# Patient Record
Sex: FEMALE | Race: WHITE | Employment: OTHER | URBAN - METROPOLITAN AREA
[De-identification: names, ages, dates, MRNs, and addresses within clinical notes are randomized per-mention and may not be internally consistent; named-entity substitution may affect disease eponyms.]

---

## 2017-01-02 ENCOUNTER — TELEPHONE (OUTPATIENT)
Dept: NURSING | Facility: CLINIC | Age: 67
End: 2017-01-02

## 2017-01-02 DIAGNOSIS — R73.01 ELEVATED FASTING GLUCOSE: ICD-10-CM

## 2017-01-02 DIAGNOSIS — E78.5 ELEVATED LIPIDS: Primary | ICD-10-CM

## 2017-01-02 DIAGNOSIS — E87.5 HIGH SERUM POTASSIUM LEVEL: ICD-10-CM

## 2017-01-02 NOTE — TELEPHONE ENCOUNTER
"Result note 12/20/16: \"Cholesterol is high as are triglycerides. HDL-the good cholesterol is low. I would recommend ensuring focusing on both aerobic and strength training, maybe start by increasing an extra day each week. Weight loss would be helpful as well. Additionally, decreasing processed foods and sugars in the diet can help. If not already doing so, adding in fish oil and starting fiber supplement like metamucil may also be able to help. I would do this for 3-6 months and then return for repeat fasting cholesterol. If still elevated, would likely need medications.  Glucose was a bit high. I would recommend she return for hemoglobin A1c-please order this as future lab. Potassium was a little high as well, this is likely due to how the blood was handled, but we can recheck just to be sure-please order potassium as future lab.  Pap and HPV were negative, other labs normal.  Please call pt to notify.   Arlen Montemayor, DO.\"    Pt calling for results from 12/20/16. Read pt's result note. Future lab orders placed. Pt stated understanding and requested note from Dr. Montemayor be sent to her home address. Lab printed that stated note from Dr. Montemayor.   "

## 2018-06-04 ENCOUNTER — OFFICE VISIT (OUTPATIENT)
Dept: OBGYN | Facility: CLINIC | Age: 68
End: 2018-06-04
Payer: COMMERCIAL

## 2018-06-04 VITALS
HEIGHT: 64 IN | BODY MASS INDEX: 28.58 KG/M2 | WEIGHT: 167.4 LBS | DIASTOLIC BLOOD PRESSURE: 72 MMHG | SYSTOLIC BLOOD PRESSURE: 118 MMHG

## 2018-06-04 DIAGNOSIS — R30.0 DYSURIA: ICD-10-CM

## 2018-06-04 DIAGNOSIS — R39.89 POSSIBLE URINARY TRACT INFECTION: Primary | ICD-10-CM

## 2018-06-04 DIAGNOSIS — N89.8 ITCHING OF VAGINA: ICD-10-CM

## 2018-06-04 LAB
ALBUMIN UR-MCNC: NEGATIVE MG/DL
APPEARANCE UR: CLEAR
BACTERIA #/AREA URNS HPF: ABNORMAL /HPF
BILIRUB UR QL STRIP: NEGATIVE
COLOR UR AUTO: YELLOW
GLUCOSE UR STRIP-MCNC: NEGATIVE MG/DL
GRAM STN SPEC: NORMAL
GRAM STN SPEC: NORMAL
HGB UR QL STRIP: ABNORMAL
KETONES UR STRIP-MCNC: ABNORMAL MG/DL
LEUKOCYTE ESTERASE UR QL STRIP: NEGATIVE
MUCOUS THREADS #/AREA URNS LPF: PRESENT /LPF
NITRATE UR QL: NEGATIVE
PH UR STRIP: 5.5 PH (ref 5–7)
RBC #/AREA URNS AUTO: ABNORMAL /HPF
SOURCE: ABNORMAL
SP GR UR STRIP: 1.02 (ref 1–1.03)
SPECIMEN SOURCE: NORMAL
UROBILINOGEN UR STRIP-ACNC: 0.2 EU/DL (ref 0.2–1)
WBC #/AREA URNS AUTO: ABNORMAL /HPF

## 2018-06-04 PROCEDURE — 99213 OFFICE O/P EST LOW 20 MIN: CPT | Performed by: NURSE PRACTITIONER

## 2018-06-04 PROCEDURE — 87205 SMEAR GRAM STAIN: CPT | Performed by: NURSE PRACTITIONER

## 2018-06-04 PROCEDURE — 87653 STREP B DNA AMP PROBE: CPT | Performed by: NURSE PRACTITIONER

## 2018-06-04 PROCEDURE — 87086 URINE CULTURE/COLONY COUNT: CPT | Performed by: NURSE PRACTITIONER

## 2018-06-04 PROCEDURE — 87102 FUNGUS ISOLATION CULTURE: CPT | Performed by: NURSE PRACTITIONER

## 2018-06-04 PROCEDURE — 81001 URINALYSIS AUTO W/SCOPE: CPT | Performed by: NURSE PRACTITIONER

## 2018-06-04 RX ORDER — FLUCONAZOLE 150 MG/1
150 TABLET ORAL
Qty: 2 TABLET | Refills: 0 | Status: SHIPPED | OUTPATIENT
Start: 2018-06-04 | End: 2019-03-28

## 2018-06-04 NOTE — MR AVS SNAPSHOT
"              After Visit Summary   6/4/2018    Cydney Menon    MRN: [id number]           Patient Information     Date Of Birth          1950        Visit Information        Provider Department      6/4/2018 11:30 AM Anabel Shabazz APRN CNP [facility]        Today's Diagnoses     Possible urinary tract infection    -  1    Itching of vagina        Dysuria           Follow-ups after your visit        Your next 10 appointments already scheduled     Aug 06, 2018 10:00 AM CDT   MA SCREENING DIGITAL BILATERAL with WEMA1   [facility] ([facility])    [address] [place] [address], [address]  [facility] [address] [phone]           Do not use any powder, lotion or deodorant under your arms or on your breast. If you do, we will ask you to remove it before your exam.  Wear comfortable, two-piece clothing.  If you have any allergies, tell your care team.  Bring any previous mammograms from other facilities or have them mailed to the breast center. Three-dimensional (3D) mammograms are available at [place] locations in [facility], [facility], and [place]. [facility] locations include [place] and [facility] & [facility] [place] in [place]. Benefits of 3D mammograms include: - Improved rate of cancer detection - Decreases your chance of having to go back for more tests, which means fewer: - \"False-positive\" results (This means that there is an abnormal area but it isn't cancer.) - Invasive testing procedures, such as a biopsy or surgery - Can provide clearer images of the breast if you have dense breast tissue. 3D mammography is an optional exam that anyone can have with a 2D mammogram. It doesn't replace or take the place of a 2D mammogram. 2D mammograms remain an effective screening test for all women.  Not all insurance companies cover the cost of a 3D mammogram. Check with your insurance.        " "    Aug 06, 2018 10:20 AM CDT   PHYSICAL with Arlen Gilmore Masters, DO   [facility] ([facility])    [address] [place] [id number] [phone]              Who to contact     If you have questions or need follow up information about today's clinic visit or your schedule please contact [facility] directly at [phone].  Normal or non-critical lab and imaging results will be communicated to you by MyChart, letter or phone within 4 business days after the clinic has received the results. If you do not hear from us within 7 days, please contact the clinic through MyChart or phone. If you have a critical or abnormal lab result, we will notify you by phone as soon as possible.  Submit refill requests through [employer] or call your pharmacy and they will forward the refill request to us. Please allow 3 business days for your refill to be completed.          Additional Information About Your Visit        Care EveryWhere ID     This is your Care EveryWhere ID. This could be used by other organizations to access your [facility] medical records  [id number]        Your Vitals Were     Height BMI (Body Mass Index)                5' 4\" (1.626 m) 28.73 kg/m2           Blood Pressure from Last 3 Encounters:   06/04/18 118/72   12/12/16 120/80    Weight from Last 3 Encounters:   06/04/18 167 lb 6.4 oz (75.9 kg)   12/12/16 168 lb (76.2 kg)              We Performed the Following     Gram stain     Group B strep PCR     UA with Microscopic     Urine Culture Aerobic Bacterial     Yeast culture          Today's Medication Changes          These changes are accurate as of 6/4/18 11:30 AM.  If you have any questions, ask your nurse or doctor.               Start taking these medicines.        Dose/Directions    fluconazole 150 MG tablet   Commonly known as:  DIFLUCAN   Used for:  Itching of vagina   Started by:  Anabel Shabazz APRN CNP "        Dose:  150 mg   Take 1 tablet (150 mg) by mouth every 3 days   Quantity:  2 tablet   Refills:  0            Where to get your medicines      These medications were sent to [facility] [address] [facility] [employer] [facility] [place] - [address]  [address], [place] [address]     Phone:  [phone]     fluconazole 150 MG tablet                Primary Care Provider Office Phone # Fax #    [facility] [phone]       [facility] [address]  [facility] [address]  [facility] [address]        Equal Access to Services     IQRA WISEMAN : Hadii aad ku hadasho Soomaali, waaxda luqadaha, qaybta kaalmada adeegyada, janes jones . So [facility] [phone].    ATENCIÓN: Si habla español, tiene a la disposición servicios gratuitos de asistencia lingüística. [facility] [phone].    We comply with applicable federal civil rights laws and [place] laws. We do not discriminate on the basis of race, color, national origin, age, disability, sex, sexual orientation, or gender identity.            Thank you!     Thank you for choosing [facility]  for your care. Our goal is always to provide you with excellent care. Hearing back from our patients is one way we can continue to improve our services. Please take a few minutes to complete the written survey that you may receive in the mail after your visit with us. Thank you!             Your Updated Medication List - Protect others around you: Learn how to safely use, store and throw away your medicines at www.disposemymeds.org.          This list is accurate as of 6/4/18 11:30 AM.  Always use your most recent med list.                   Brand Name Dispense Instructions for use Diagnosis    CLARITIN PO           fluconazole 150 MG tablet    DIFLUCAN    2 tablet    Take 1 tablet (150 mg) by mouth every 3 days    Itching of vagina       Multi-vitamin Tabs tablet      Take 1 tablet by  mouth daily

## 2018-06-05 LAB
BACTERIA SPEC CULT: NO GROWTH
GP B STREP DNA SPEC QL NAA+PROBE: NEGATIVE
Lab: NORMAL
SPECIMEN SOURCE: NORMAL
SPECIMEN SOURCE: NORMAL

## 2018-06-08 LAB
SPECIMEN SOURCE: NORMAL
YEAST SPEC QL CULT: NORMAL

## 2018-08-14 ENCOUNTER — TRANSFERRED RECORDS (OUTPATIENT)
Dept: HEALTH INFORMATION MANAGEMENT | Facility: CLINIC | Age: 68
End: 2018-08-14

## 2018-08-14 ENCOUNTER — HOSPITAL ENCOUNTER (EMERGENCY)
Facility: CLINIC | Age: 68
Discharge: HOME | End: 2018-08-14
Attending: EMERGENCY MEDICINE | Admitting: EMERGENCY MEDICINE
Payer: MEDICARE

## 2018-08-14 VITALS
RESPIRATION RATE: 17 BRPM | DIASTOLIC BLOOD PRESSURE: 84 MMHG | OXYGEN SATURATION: 98 % | TEMPERATURE: 98.9 F | HEIGHT: 64 IN | WEIGHT: 155 LBS | SYSTOLIC BLOOD PRESSURE: 137 MMHG | BODY MASS INDEX: 26.46 KG/M2

## 2018-08-14 DIAGNOSIS — R68.84 JAW PAIN: ICD-10-CM

## 2018-08-14 DIAGNOSIS — R94.31 ABNORMAL ELECTROCARDIOGRAM: ICD-10-CM

## 2018-08-14 DIAGNOSIS — M54.2 NECK PAIN ON LEFT SIDE: ICD-10-CM

## 2018-08-14 LAB
ANION GAP SERPL CALCULATED.3IONS-SCNC: 8 MMOL/L (ref 3–14)
BASOPHILS # BLD AUTO: 0.1 10E9/L (ref 0–0.2)
BASOPHILS NFR BLD AUTO: 0.7 %
BUN SERPL-MCNC: 21 MG/DL (ref 7–30)
CALCIUM SERPL-MCNC: 8.9 MG/DL (ref 8.5–10.1)
CHLORIDE SERPL-SCNC: 108 MMOL/L (ref 94–109)
CO2 SERPL-SCNC: 25 MMOL/L (ref 20–32)
CREAT SERPL-MCNC: 0.8 MG/DL (ref 0.52–1.04)
DIFFERENTIAL METHOD BLD: NORMAL
EOSINOPHIL # BLD AUTO: 0.1 10E9/L (ref 0–0.7)
EOSINOPHIL NFR BLD AUTO: 1.5 %
ERYTHROCYTE [DISTWIDTH] IN BLOOD BY AUTOMATED COUNT: 12.3 % (ref 10–15)
GFR SERPL CREATININE-BSD FRML MDRD: 71 ML/MIN/1.7M2
GLUCOSE SERPL-MCNC: 92 MG/DL (ref 70–99)
HCT VFR BLD AUTO: 39.5 % (ref 35–47)
HGB BLD-MCNC: 13.4 G/DL (ref 11.7–15.7)
IMM GRANULOCYTES # BLD: 0 10E9/L (ref 0–0.4)
IMM GRANULOCYTES NFR BLD: 0.3 %
INTERPRETATION ECG - MUSE: NORMAL
LYMPHOCYTES # BLD AUTO: 1.8 10E9/L (ref 0.8–5.3)
LYMPHOCYTES NFR BLD AUTO: 25.9 %
MCH RBC QN AUTO: 32.2 PG (ref 26.5–33)
MCHC RBC AUTO-ENTMCNC: 33.9 G/DL (ref 31.5–36.5)
MCV RBC AUTO: 95 FL (ref 78–100)
MONOCYTES # BLD AUTO: 0.4 10E9/L (ref 0–1.3)
MONOCYTES NFR BLD AUTO: 6 %
NEUTROPHILS # BLD AUTO: 4.5 10E9/L (ref 1.6–8.3)
NEUTROPHILS NFR BLD AUTO: 65.6 %
NRBC # BLD AUTO: 0 10*3/UL
NRBC BLD AUTO-RTO: 0 /100
PLATELET # BLD AUTO: 198 10E9/L (ref 150–450)
POTASSIUM SERPL-SCNC: 3.5 MMOL/L (ref 3.4–5.3)
RBC # BLD AUTO: 4.16 10E12/L (ref 3.8–5.2)
SODIUM SERPL-SCNC: 141 MMOL/L (ref 133–144)
TROPONIN I SERPL-MCNC: <0.015 UG/L (ref 0–0.04)
WBC # BLD AUTO: 6.8 10E9/L (ref 4–11)

## 2018-08-14 PROCEDURE — 84484 ASSAY OF TROPONIN QUANT: CPT | Performed by: EMERGENCY MEDICINE

## 2018-08-14 PROCEDURE — 80048 BASIC METABOLIC PNL TOTAL CA: CPT | Performed by: EMERGENCY MEDICINE

## 2018-08-14 PROCEDURE — 99284 EMERGENCY DEPT VISIT MOD MDM: CPT

## 2018-08-14 PROCEDURE — 93005 ELECTROCARDIOGRAM TRACING: CPT

## 2018-08-14 PROCEDURE — 85025 COMPLETE CBC W/AUTO DIFF WBC: CPT | Performed by: EMERGENCY MEDICINE

## 2018-08-14 NOTE — ED AVS SNAPSHOT
Emergency Department    [address] [facility] [address]    Phone:  [phone]    Fax:  [phone]                                       Cydney Menon   MRN: [id number]    Department:   Emergency Department   Date of Visit:  8/14/2018           Patient Information     Date Of Birth          1950        Your diagnoses for this visit were:     Neck pain on left side     Jaw pain     Abnormal electrocardiogram        You were seen by Rajendra Bass MD.      Follow-up Information     Call Clinic, [facility] [place].    Why:  As needed    Contact information:    [facility]  [address] [facility] [address]  [facility] [id number] [phone]          Follow up with  Emergency Department.    Specialty:  EMERGENCY MEDICINE    Why:  As needed, If symptoms worsen    Contact information:    [address] [facility] [id number] [phone]        Discharge Instructions       There are no signs of a heart attack or any other immediately dangerous process at this time.  No new medications are needed tonight, though you are welcome back for sudden worsening at any hour.    Your next 10 appointments already scheduled     Sep 24, 2018 10:30 AM CDT   MA SCREENING DIGITAL BILATERAL with WEMA1   [facility] ([facility] [place])    [address] [facility] [address]  [facility] [id number] [phone]           Do not use any powder, lotion or deodorant under your arms or on your breast. If you do, we will ask you to remove it before your exam.  Wear comfortable, two-piece clothing.  If you have any allergies, tell your care team.  Bring any previous mammograms from other facilities or have them mailed to the breast center. Three-dimensional (3D) mammograms are available at [place] locations in [facility], [place], [place], [facility], [place], [place], and [place]. [facility] locations include  "[facility] & [facility] [place] in [place]. Benefits of 3D mammograms include: - Improved rate of cancer detection - Decreases your chance of having to go back for more tests, which means fewer: - \"False-positive\" results (This means that there is an abnormal area but it isn't cancer.) - Invasive testing procedures, such as a biopsy or surgery - Can provide clearer images of the breast if you have dense breast tissue. 3D mammography is an optional exam that anyone can have with a 2D mammogram. It doesn't replace or take the place of a 2D mammogram. 2D mammograms remain an effective screening test for all women.  Not all insurance companies cover the cost of a 3D mammogram. Check with your insurance.            Sep 24, 2018 11:00 AM CDT   PHYSICAL with Arlen Gilmore Masters, DO   [facility] ([facility] [place])    [address] [place] [id number] [phone]              24 Hour Appointment Hotline       To make an appointment at any [facility], call [phone]). If you don't have a family doctor or clinic, we will help you find one. [place] clinics are conveniently located to serve the needs of you and your family.             Review of your medicines      Our records show that you are taking the medicines listed below. If these are incorrect, please call your family doctor or clinic.        Dose / Directions Last dose taken    CLARITIN PO        Refills:  0        fluconazole 150 MG tablet   Commonly known as:  DIFLUCAN   Dose:  150 mg   Quantity:  2 tablet        Take 1 tablet (150 mg) by mouth every 3 days   Refills:  0        Multi-vitamin Tabs tablet   Dose:  1 tablet        Take 1 tablet by mouth daily   Refills:  0                Procedures and tests performed during your visit     Basic metabolic panel    CBC with platelets differential    EKG 12 lead    Troponin I      Orders Needing Specimen Collection     " None      Pending Results     No orders found from 8/12/2018 to 8/15/2018.            Pending Culture Results     No orders found from 8/12/2018 to 8/15/2018.            Pending Results Instructions     If you had any lab results that were not finalized at the time of your Discharge, you can call the ED Lab Result RN at [phone]. You will be contacted by this team for any positive Lab results or changes in treatment. The nurses are available 7 days a week from 10A to 6:30P.  You can leave a message 24 hours per day and they will return your call.        Test Results From Your Hospital Stay        8/14/2018  8:02 PM      Component Results     Component Value Ref Range & Units Status    WBC 6.8 4.0 - 11.0 10e9/L Final    RBC Count 4.16 3.8 - 5.2 10e12/L Final    Hemoglobin 13.4 11.7 - 15.7 g/dL Final    Hematocrit 39.5 35.0 - 47.0 % Final    MCV 95 78 - 100 fl Final    MCH 32.2 26.5 - 33.0 pg Final    MCHC 33.9 31.5 - 36.5 g/dL Final    RDW 12.3 10.0 - 15.0 % Final    Platelet Count 198 150 - 450 10e9/L Final    Diff Method Automated Method  Final    % Neutrophils 65.6 % Final    % Lymphocytes 25.9 % Final    % Monocytes 6.0 % Final    % Eosinophils 1.5 % Final    % Basophils 0.7 % Final    % Immature Granulocytes 0.3 % Final    Nucleated RBCs 0 0 /100 Final    Absolute Neutrophil 4.5 1.6 - 8.3 10e9/L Final    Absolute Lymphocytes 1.8 0.8 - 5.3 10e9/L Final    Absolute Monocytes 0.4 0.0 - 1.3 10e9/L Final    Absolute Eosinophils 0.1 0.0 - 0.7 10e9/L Final    Absolute Basophils 0.1 0.0 - 0.2 10e9/L Final    Abs Immature Granulocytes 0.0 0 - 0.4 10e9/L Final    Absolute Nucleated RBC 0.0  Final         8/14/2018  8:14 PM      Component Results     Component Value Ref Range & Units Status    Sodium 141 133 - 144 mmol/L Final    Potassium 3.5 3.4 - 5.3 mmol/L Final    Chloride 108 94 - 109 mmol/L Final    Carbon Dioxide 25 20 - 32 mmol/L Final    Anion Gap 8 3 - 14 mmol/L Final    Glucose 92 70 - 99 mg/dL Final     Urea Nitrogen 21 7 - 30 mg/dL Final    Creatinine 0.80 0.52 - 1.04 mg/dL Final    GFR Estimate 71 >60 mL/min/1.7m2 Final    Non  GFR Calc    GFR Estimate If Black 86 >60 mL/min/1.7m2 Final    African American GFR Calc    Calcium 8.9 8.5 - 10.1 mg/dL Final         8/14/2018  8:18 PM      Component Results     Component Value Ref Range & Units Status    Troponin I ES <0.015 0.000 - 0.045 ug/L Final    The 99th percentile for upper reference range is 0.045 ug/L.  Troponin values   in the range of 0.045 - 0.120 ug/L may be associated with risks of adverse   clinical events.                  Clinical Quality Measure: Blood Pressure Screening     Your blood pressure was checked while you were in the emergency department today. The last reading we obtained was  BP: 151/82 . Please read the guidelines below about what these numbers mean and what you should do about them.  If your systolic blood pressure (the top number) is less than 120 and your diastolic blood pressure (the bottom number) is less than 80, then your blood pressure is normal. There is nothing more that you need to do about it.  If your systolic blood pressure (the top number) is 120-139 or your diastolic blood pressure (the bottom number) is 80-89, your blood pressure may be higher than it should be. You should have your blood pressure rechecked within a year by a primary care provider.  If your systolic blood pressure (the top number) is 140 or greater or your diastolic blood pressure (the bottom number) is 90 or greater, you may have high blood pressure. High blood pressure is treatable, but if left untreated over time it can put you at risk for heart attack, stroke, or kidney failure. You should have your blood pressure rechecked by a primary care provider within the next 4 weeks.  If your provider in the emergency department today gave you specific instructions to follow-up with your doctor or provider even sooner than that, you should  follow that instruction and not wait for up to 4 weeks for your follow-up visit.        Thank you for choosing [facility]       Thank you for choosing [facility] for your care. Our goal is always to provide you with excellent care. Hearing back from our patients is one way we can continue to improve our services. Please take a few minutes to complete the written survey that you may receive in the mail after you visit with us. Thank you!        Care EveryWhere ID     This is your Care EveryWhere ID. This could be used by other organizations to access your [facility] medical records  [id number]        Equal Access to Services     IQRA WISEMAN : Gilson Munoz, prema gant, saumya ricks, janes jones . So [facility] [phone].    ATENCIÓN: Si habla español, tiene a la disposición servicios gratuitos de asistencia lingüística. Llame al [phone].    We comply with applicable federal civil rights laws and [place] laws. We do not discriminate on the basis of race, color, national origin, age, disability, sex, sexual orientation, or gender identity.            After Visit Summary       This is your record. Keep this with you and show to your community pharmacist(s) and doctor(s) at your next visit.

## 2018-08-14 NOTE — ED AVS SNAPSHOT
Emergency Department    [address] [place] [address]    Phone:  [phone]    Fax:  [phone]                                       Cydney Menon   MRN: [id number]    Department:   Emergency Department   Date of Visit:  8/14/2018           After Visit Summary Signature Page     I have received my discharge instructions, and my questions have been answered. I have discussed any challenges I see with this plan with the nurse or doctor.    ..........................................................................................................................................  Patient/Patient Representative Signature      ..........................................................................................................................................  Patient Representative Print Name and Relationship to Patient    ..................................................               ................................................  Date                                            Time    ..........................................................................................................................................  Reviewed by Signature/Title    ...................................................              ..............................................  Date                                                            Time

## 2018-08-15 NOTE — ED NOTES
"Pt reports having jaw discomfort that traveled through her neck into her left upper arm. Pt is having a difficult time describing the sensation. Pt denies pain, numbness, and tingling. Pt denies CP and SOB. Pt states the sensation was almost like a \"full\" feeling in her jaw.   "

## 2018-08-15 NOTE — DISCHARGE INSTRUCTIONS
There are no signs of a heart attack or any other immediately dangerous process at this time.  No new medications are needed tonight, though you are welcome back for sudden worsening at any hour.

## 2018-08-15 NOTE — ED NOTES
Bed: ED28  Expected date:   Expected time:   Means of arrival:   Comments:  alfonso [phone] jaw pain

## 2018-08-15 NOTE — ED PROVIDER NOTES
"  History     Chief Complaint:  Jaw Pain    HPI   Cydney Menon is a 68 year old female currently on Penicillin for strep pharyngitis who presents with jaw pain. Around 1000 this morning, the patient states she began experiencing left sided neck pain, radiating into her left jaw and left upper arm. She notes she does have some jaw pain at baseline from her neck disc issues, which is typically alleviated by massage, which helped today too. Since her jaw pain continued to persist into this evening, she notes she went to Urgent Care where an ECG was performed, showing \"prolonged QT\" (findings below), so the patient was sent directly to the ED via EMS for further evaluation and work-up.  She denies any chest pain, shortness of breath, or other acute symptoms at this time.  She states she plays tennis 3 times a week never has any worrisome exertional symptoms.  She has had many years of intermittent neck discomfort just like this.  She feels that her throat is not worsening.  No fevers.  No recent injuries.  No focal weakness.  She has never been diagnosed with any heart or lung problems.    ECG (18:15:29):  Rate 76 bpm. WV interval 160. QRS duration 90. QT/QTc 420/472. P-R-T axes 72 54 7. Normal sinus rhythm. Nonspecific ST and T wave abnormality. Prolonged QT. Abnormal ECG.    CARDIAC RISK FACTORS:  Sex:    Female  Tobacco:   No  Hypertension:   No  Hyperlipidemia:  No  Diabetes:   No  Family History:  No    PE/DVT RISK FACTORS:  Sex:    Female  Hormones:   No  Tobacco:   No  Cancer:   No  Travel:   No  Surgery:   No  Other immobilization: No  Personal history:  No  Family history:  No    Allergies:  Morphine  Oxycodone  Diagnostic x-ray materials    Medications:    Diflucan  Penicillin  Loratadine     Past Medical History:    Colon polyp  Nephrolithiasis    Past Surgical History:    Kidney stone removal  ACL repair    Family History:    Hypertension    Social History:  Smoking status: No  Alcohol use: Yes, " "rarely  PCP: [facility]  Marital Status:  [2]     Review of Systems   All other systems reviewed and are negative.    Physical Exam   Patient Vitals for the past 24 hrs:   BP Temp Temp src Heart Rate Resp SpO2 Height Weight   08/14/18 1922 151/82 98.9  F (37.2  C) Oral 73 16 99 % 1.626 m (5' 4\") 70.3 kg (155 lb)     Physical Exam  General: Nontoxic-appearing woman sitting upright in room 28, daughter at bedside  HENT: mucous membranes moist, oropharynx clear, no focal tenderness to percussion of her teeth, full range of motion of mandible without trismus, no palpable swelling or focal tenderness to the left neck  CV: regular rate, regular rhythm, no lower extremity edema, no JVD, palpable symmetric radial pulses  Resp: clear throughout, normal effort, no crackles or wheezing  GI: abdomen soft and nontender, no guarding, negative Akhtar's sign  MSK: no bony tenderness to chest, no midline point tenderness to the cervical spine with good range of motion of the neck as well as left shoulder elbow and wrist  Skin: appropriately warm and dry, no erythema or vesicles to chest wall  Neuro: alert, clear speech, oriented   Psych: Pleasant, cooperative      Emergency Department Course   ECG (19:31:00):  Rate 71 bpm. SC interval 156. QRS duration 86. QT/QTc 416/452. P-R-T axes 65 23 -7. Normal sinus rhythm. Nonspecific ST abnormality. Abnormal ECG. Interpreted at 1939 by Rajendra Bass MD.    Laboratory:  CBC: WNL (WBC 6.8, HGB 13.4, )  BMP: WNL (Creatinine 0.80)  Troponin: <0.015    Emergency Department Course:  The patient arrived in the emergency department via EMS.  Past medical records, nursing notes, and vitals reviewed.  2006: I performed an exam of the patient and obtained history, as documented above.    I performed electronic chart review in [facility].  The patient was placed on continuous cardiac and pulse ox monitoring.    ECG performed, results above.  IV inserted and " "blood drawn.    I rechecked the patient. Findings and plan explained to the patient. Patient discharged home with instructions regarding supportive care, medications, and reasons to return. The importance of close follow-up was reviewed.     Impression & Plan    Medical Decision Making:  She was referred here out of apparent concern for a possible coronary equivalent given her left jaw pain, the patient is very clear that she has had recurring left-sided neck discomfort like this many times over the years.  It has always been of the same character.  After many hours, her troponin is undetectable.  Her EKG, was slightly abnormal, does not show any obvious definitive findings.  She states that years ago she was told she had an abnormal EKG, and this seems to remain the case.  She is very physically fit and active and never has exertional symptoms.  I do not think she requires hospitalization or further emergent testing at this time, discussed with her the possibility of pursuing an outpatient stress test at some time.  She is relieved by this assessment and was discharged home.  No signs of necrotizing neck infection, emergent radiculopathy or other reason to suspect a neurosurgical or other emergency.    Diagnosis:    ICD-10-CM   1. Neck pain on left side M54.2   2. Jaw pain R68.84   3. Abnormal electrocardiogram R94.31     Disposition: Discharged to home    This record was created at least in part using electronic voice recognition software, so please excuse any \"typos.\"      Betty Lo  8/14/2018    EMERGENCY DEPARTMENT    I, Betty Lo, am serving as a scribe at 8:06 PM on 8/14/2018 to document services personally performed by Rajendra Bass MD based on my observations and the provider's statements to me.        Rajendra Bass MD  08/15/18 0108    "

## 2018-12-27 ENCOUNTER — ANCILLARY PROCEDURE (OUTPATIENT)
Dept: MAMMOGRAPHY | Facility: CLINIC | Age: 68
End: 2018-12-27
Attending: OBSTETRICS & GYNECOLOGY
Payer: COMMERCIAL

## 2018-12-27 DIAGNOSIS — Z12.31 VISIT FOR SCREENING MAMMOGRAM: ICD-10-CM

## 2018-12-27 PROCEDURE — 77067 SCR MAMMO BI INCL CAD: CPT | Mod: TC

## 2018-12-27 PROCEDURE — 77063 BREAST TOMOSYNTHESIS BI: CPT | Mod: TC

## 2019-03-28 ENCOUNTER — OFFICE VISIT (OUTPATIENT)
Dept: OBGYN | Facility: CLINIC | Age: 69
End: 2019-03-28
Payer: MEDICARE

## 2019-03-28 ENCOUNTER — NURSE TRIAGE (OUTPATIENT)
Dept: NURSING | Facility: CLINIC | Age: 69
End: 2019-03-28

## 2019-03-28 DIAGNOSIS — R30.0 DYSURIA: Primary | ICD-10-CM

## 2019-03-28 LAB
ALBUMIN UR-MCNC: NEGATIVE MG/DL
APPEARANCE UR: CLEAR
BACTERIA #/AREA URNS HPF: ABNORMAL /HPF
BILIRUB UR QL STRIP: NEGATIVE
COLOR UR AUTO: YELLOW
GLUCOSE UR STRIP-MCNC: NEGATIVE MG/DL
HGB UR QL STRIP: ABNORMAL
KETONES UR STRIP-MCNC: NEGATIVE MG/DL
LEUKOCYTE ESTERASE UR QL STRIP: ABNORMAL
NITRATE UR QL: NEGATIVE
NON-SQ EPI CELLS #/AREA URNS LPF: ABNORMAL /LPF
PH UR STRIP: 5 PH (ref 5–7)
RBC #/AREA URNS AUTO: ABNORMAL /HPF
SOURCE: ABNORMAL
SP GR UR STRIP: >1.03 (ref 1–1.03)
UROBILINOGEN UR STRIP-ACNC: 0.2 EU/DL (ref 0.2–1)
WBC #/AREA URNS AUTO: ABNORMAL /HPF

## 2019-03-28 PROCEDURE — 87086 URINE CULTURE/COLONY COUNT: CPT | Performed by: OBSTETRICS & GYNECOLOGY

## 2019-03-28 PROCEDURE — 99441 ZZC PHYSICIAN TELEPHONE EVALUATION 5-10 MIN: CPT | Performed by: OBSTETRICS & GYNECOLOGY

## 2019-03-28 PROCEDURE — 81001 URINALYSIS AUTO W/SCOPE: CPT | Performed by: OBSTETRICS & GYNECOLOGY

## 2019-03-28 RX ORDER — SULFAMETHOXAZOLE/TRIMETHOPRIM 800-160 MG
1 TABLET ORAL 2 TIMES DAILY
Qty: 6 TABLET | Refills: 0 | Status: SHIPPED | OUTPATIENT
Start: 2019-03-28 | End: 2020-06-08

## 2019-03-28 SDOH — HEALTH STABILITY: MENTAL HEALTH: HOW OFTEN DO YOU HAVE A DRINK CONTAINING ALCOHOL?: NEVER

## 2019-03-28 NOTE — PROGRESS NOTES
"Cydney Menon is a 69 year old female who is being evaluated via a telephone visit.      The patient has been notified of following (by IZZY Newton CMA     \"We have found that certain health care needs can be provided without the need for a physical exam.  This service lets us provide the care you need with a short phone conversation.  If a prescription is necessary we can send it directly to your pharmacy.  If lab work is needed we can place an order for that and you can then stop by our lab to have the test done at a later time.    This telephone visit will be conducted via 3 way call with the you (the patient) , the physician/provider, and a me all on the line at the same time.  This allows your physician/provider to have the phone conversation with you while I will be taking notes for your medical record.  We will have full access to your [facility] medical record during this entire phone call.    Since this is like an office visit,  will bill your insurance company for this service.  Please check with your medical insurance if this type of telephone/virtual is covered . You may be responsible for the cost of this service if insurance coverage is denied.  The typical cost is $30 (10min), $59(11-20min) and $85 (21-30min)     If during the course of the call the physician/provider feels a telephone visit is not appropriate, you will not be charged for this service\"    Consent has been obtained for this service by care team member: yes.  See the scanned image in the medical record.    S: The history as provided by the patient to the provider during this 3 way call include     Pertinent parts of the the patient's medical history reviewed and confirmed by the provider included :      Total time of call between patient and provider was 5 minutes     Cedrick Farmer MD    ===================================================    I have reviewed the note as documented above.  This accurately captures the " substance of my conversation with the patient,    Additional provider notes:Pt had UTI last summer at [facility]. Thought Macrobid didn't work and treated with Bactrim. Felt that took care of it. There are 2 negative cultures in chart. No UC that is positive. Tried to explain about negative cultures but she didn't understand. Feels that her UTIs are hard to diagnose.     Assessment/Plan:  (R30.0) Dysuria  (primary encounter diagnosis)  Comment: Probable UTI uncomplicated.  Plan: *UA reflex to Microscopic, Urine Microscopic        Will send in Septra  for 3 days.   Culture urine for diagnosis.   Should expect improvement in 24 to 48 hours.   Will call with UC results on Monday    Cedrick Farmer MD

## 2019-03-28 NOTE — PROGRESS NOTES
"    SUBJECTIVE:                                                   Cydney Menon is a 69 year old female who presents to clinic today for the following health issue(s):  No chief complaint on file.      Additional information: ***    HPI:  ***    No LMP recorded. Patient is postmenopausal..   Patient {is/is not:821538::\"is\"} sexually active, .  Using {[facility] CONTRACEPTION:874616} for contraception.    reports that  has never smoked. she has never used smokeless tobacco.  {Tobacco Cessation -- Delete if patient is a non-smoker:288853}  STD testing offered?  {PLC GC/CHLAMYDIA:292642}    Health maintenance updated:  {yes no:994157}    Today's PHQ-2 Score: No flowsheet data found.  Today's PHQ-9 Score:   PHQ-9 SCORE 2016   PHQ-9 Total Score 0     Today's ANASTASIA-7 Score:   ANASTASIA-7 SCORE 2016   Total Score 0       Problem list and histories reviewed & adjusted, as indicated.  Additional history: as documented.    There is no problem list on file for this patient.    Past Surgical History:   Procedure Laterality Date     C REMOVAL OF KIDNEY STONE       KNEE SURGERY      ACL      Social History     Tobacco Use     Smoking status: Never Smoker     Smokeless tobacco: Never Used   Substance Use Topics     Alcohol use: Yes     Alcohol/week: 0.0 oz     Comment: rarely      Problem (# of Occurrences) Relation (Name,Age of Onset)    Hypertension (1) Father            Current Outpatient Medications   Medication Sig     fluconazole (DIFLUCAN) 150 MG tablet Take 1 tablet (150 mg) by mouth every 3 days     Loratadine (CLARITIN PO)      multivitamin, therapeutic with minerals (MULTI-VITAMIN) TABS tablet Take 1 tablet by mouth daily     No current facility-administered medications for this visit.      Allergies   Allergen Reactions     Diagnostic X-Ray Materials Hives     Morphine Nausea and Vomiting     Oxycodone Nausea and Vomiting     Shellfish-Derived Products        ROS:  {PLC ROSGYN:266772::\"12 point review of " "systems negative other than symptoms noted below.\"}    OBJECTIVE:     There were no vitals taken for this visit.  There is no height or weight on file to calculate BMI.    Exam:  {[facility] EXAM CHOICES:366761}     In-Clinic Test Results:  No results found for this or any previous visit (from the past 24 hour(s)).    ASSESSMENT/PLAN:                                                        ICD-10-CM    1. Dysuria R30.0 *UA reflex to Microscopic       There are no Patient Instructions on file for this visit.    ***    Cedrick Farmer MD  [facility]  "

## 2019-03-28 NOTE — TELEPHONE ENCOUNTER
Cydney feels she has a UTI.  Cydney is having burning and frequent urination.      Reason for Disposition    Urinating more frequently than usual (i.e., frequency)    Additional Information    Negative: Shock suspected (e.g., cold/pale/clammy skin, too weak to stand, low BP, rapid pulse)    Negative: Sounds like a life-threatening emergency to the triager    Negative: [1] Unable to urinate (or only a few drops) > 4 hours AND     [2] bladder feels very full (e.g., palpable bladder or strong urge to urinate)    Negative: [1] Decreased urination and [2] drinking very little AND [2] dehydration suspected (e.g., dark urine, no urine > 12 hours, very dry mouth, very lightheaded)    Negative: Patient sounds very sick or weak to the triager    Negative: Fever > 100.5 F (38.1 C)    Negative: Side (flank) or lower back pain present    Negative: [1] Can't control passage of urine (i.e., urinary incontinence) AND [2] new onset (< 2 weeks) or worsening    Protocols used: URINARY SYMPTOMS-ADULT-AH

## 2019-03-29 LAB
BACTERIA SPEC CULT: NO GROWTH
Lab: NORMAL
SPECIMEN SOURCE: NORMAL

## 2019-03-31 NOTE — RESULT ENCOUNTER NOTE
Negative for UTI. If has continued/recurrent symptoms will need further eval by urology since the UA was abnormal.

## 2019-04-02 NOTE — NURSING NOTE
Pt returned call and was informed of Dr Farmer's response to pt's UC results.  Pt states the medicine worked and no further sx's despite the negative for UTI results. She verbalized understanding and no further questions.  Verna Childs RN on 4/2/2019 at 3:17 PM

## 2020-06-05 NOTE — PROGRESS NOTES
SUBJECTIVE:                                                   Cydney Menon is a 70 year old female who presents to clinic today for the following health issue(s):  Patient presents with:  Pre-Op Exam: Spine surgery       HPI:  Having decompression L4 to S1, formaniotomy level L5to S1 at [facility] with Dr. Gooden at [place]. Has lumbar stenosis with neruogenic claudication, radiculopathy.   Tried injections, gabapentin. Tried therapy.   Has pinched nerve L4/5.     Taking aleeve only, but has stopped for surgery. Tyelonl PM occ. Claratin as needed.   Taking tumeric until last week.     Has gotten sick using narcotics post surgery in past. Wants to avoid morphine/oxycontin. Tramadol works for her.     Feels well. No recent illnesses.  has fungal pneumonia so takes precautions at home.   Continues to work outside of the home.    COVID testing done today.      Review of PMH, SocHx, SurHx, FHx, medications completed. Epic updated.      No LMP recorded. Patient is postmenopausal..     Patient is sexually active, .  Using menopause for contraception.    reports that she has never smoked. She has never used smokeless tobacco.    STD testing offered?  Declined    Health maintenance updated:  yes    Today's PHQ-2 Score: No flowsheet data found.  Today's PHQ-9 Score:   PHQ-9 SCORE 2016   PHQ-9 Total Score 0     Today's ANASTASIA-7 Score:   ANASTASIA-7 SCORE 2016   Total Score 0       Problem list and histories reviewed & adjusted, as indicated.  Additional history: as documented.    There is no problem list on file for this patient.    Past Surgical History:   Procedure Laterality Date     C REMOVAL OF KIDNEY STONE       KNEE SURGERY      ACL      Social History     Tobacco Use     Smoking status: Never Smoker     Smokeless tobacco: Never Used   Substance Use Topics     Alcohol use: Yes     Alcohol/week: 0.0 standard drinks     Frequency: Never     Comment: rarely      Problem (# of Occurrences)  "Relation (Name,Age of Onset)    Hypertension (1) Father    Lung Cancer (1) Father            Current Outpatient Medications   Medication Sig     BACLOFEN PO      multivitamin, therapeutic with minerals (MULTI-VITAMIN) TABS tablet Take 1 tablet by mouth daily     Loratadine (CLARITIN PO)      No current facility-administered medications for this visit.      Allergies   Allergen Reactions     Diagnostic X-Ray Materials Hives     Morphine Nausea and Vomiting     Oxycodone Nausea and Vomiting     Shellfish-Derived Products        ROS:  12 point review of systems negative other than symptoms noted below or in the HPI.        OBJECTIVE:     /82   Pulse 84   Ht 1.638 m (5' 4.5\")   Wt 73.2 kg (161 lb 6.4 oz)   BMI 27.28 kg/m    Body mass index is 27.28 kg/m .    Exam:  Constitutional:  Appearance: Well nourished, well developed alert, in no acute distress  Neck:  Lymph Nodes:  No lymphadenopathy present; Thyroid:  Gland size normal, nontender, no nodules or masses present on palpation  Chest:  Respiratory Effort:  Breathing unlabored. Clear to auscultation bilaterally.   Cardiovascular: Heart: Auscultation:  Regular rate, normal rhythm, no murmurs present  Gastrointestinal:  Abdominal Examination:  Abdomen nontender to palpation, tone normal without rigidity or guarding, no masses present, umbilicus without lesions; Liver/Spleen:  No hepatomegaly present, liver nontender to palpation; Hernias:  No hernias present  Lymphatic: Lymph Nodes:  No other lymphadenopathy present  Skin: General Inspection:  No rashes present, no lesions present, no areas of discoloration.  Neurologic:  Mental Status:  Oriented X3.  Normal strength and tone, sensory exam grossly normal, mentation intact and speech normal.    Psychiatric:  Mentation appears normal and affect normal/bright.     In-Clinic Test Results:  CBC obtained in clinic  EKG to be done and interpreted at [facility] 11:30am today.     ASSESSMENT/PLAN:                       "                                  ICD-10-CM    1. Preoperative examination  Z01.818 EKG 12-lead complete w/read - Clinics     CBC with platelets     CANCELED: EKG 12-lead complete w/read - Clinics   2. Spinal stenosis of lumbar region with neurogenic claudication  M48.062    3. Lumbar radiculopathy  M54.16        -Cydney is cleared to undergo planned spinal surgery 6/12/20.   No additional needs are anticipated for pt postop. She is not on any medications of consequence.   She has CBC and EKG pending.   Reviewed avoidance of NSAIDs.  If illness occurs, contact her surgeon.   Will fax note, EKG and CBC to surgeon.     Arlen Gilmore Masters, DO  [facility] FOR [facility] [place]

## 2020-06-08 ENCOUNTER — ALLIED HEALTH/NURSE VISIT (OUTPATIENT)
Dept: NURSING | Facility: CLINIC | Age: 70
End: 2020-06-08
Payer: MEDICARE

## 2020-06-08 ENCOUNTER — OFFICE VISIT (OUTPATIENT)
Dept: OBGYN | Facility: CLINIC | Age: 70
End: 2020-06-08
Payer: MEDICARE

## 2020-06-08 VITALS
SYSTOLIC BLOOD PRESSURE: 126 MMHG | BODY MASS INDEX: 26.89 KG/M2 | HEART RATE: 84 BPM | DIASTOLIC BLOOD PRESSURE: 82 MMHG | HEIGHT: 65 IN | WEIGHT: 161.4 LBS

## 2020-06-08 DIAGNOSIS — Z01.818 PREOPERATIVE EXAMINATION: ICD-10-CM

## 2020-06-08 DIAGNOSIS — Z01.818 PREOPERATIVE EXAMINATION: Primary | ICD-10-CM

## 2020-06-08 DIAGNOSIS — M54.16 LUMBAR RADICULOPATHY: ICD-10-CM

## 2020-06-08 DIAGNOSIS — M48.062 SPINAL STENOSIS OF LUMBAR REGION WITH NEUROGENIC CLAUDICATION: ICD-10-CM

## 2020-06-08 LAB
ERYTHROCYTE [DISTWIDTH] IN BLOOD BY AUTOMATED COUNT: 12.2 % (ref 10–15)
HCT VFR BLD AUTO: 44.8 % (ref 35–47)
HGB BLD-MCNC: 15 G/DL (ref 11.7–15.7)
MCH RBC QN AUTO: 31.8 PG (ref 26.5–33)
MCHC RBC AUTO-ENTMCNC: 33.5 G/DL (ref 31.5–36.5)
MCV RBC AUTO: 95 FL (ref 78–100)
PLATELET # BLD AUTO: 219 10E9/L (ref 150–450)
RBC # BLD AUTO: 4.72 10E12/L (ref 3.8–5.2)
WBC # BLD AUTO: 5.4 10E9/L (ref 4–11)

## 2020-06-08 PROCEDURE — 36415 COLL VENOUS BLD VENIPUNCTURE: CPT | Performed by: OBSTETRICS & GYNECOLOGY

## 2020-06-08 PROCEDURE — 85027 COMPLETE CBC AUTOMATED: CPT | Performed by: OBSTETRICS & GYNECOLOGY

## 2020-06-08 PROCEDURE — 99214 OFFICE O/P EST MOD 30 MIN: CPT | Performed by: OBSTETRICS & GYNECOLOGY

## 2020-06-08 PROCEDURE — 93000 ELECTROCARDIOGRAM COMPLETE: CPT

## 2020-06-08 PROCEDURE — 99207 ZZC NO CHARGE NURSE ONLY: CPT

## 2020-06-08 ASSESSMENT — MIFFLIN-ST. JEOR: SCORE: 1245.05

## 2020-06-08 NOTE — PROGRESS NOTES
EKG done.  Not faxed by this office. Will have ordering providers office fax.     Susanne BEASLEY MA @ [facility]

## 2022-11-09 ENCOUNTER — TELEPHONE (OUTPATIENT)
Dept: OBGYN | Facility: CLINIC | Age: 72
End: 2022-11-09

## 2022-11-09 NOTE — TELEPHONE ENCOUNTER
"Symptoms of dysuria have been going on for a week and wants to get on top of it. She is asking for an appointment today and no availability today or this week. Recommended urgent care and pt adamantly declined as she will not wait in line for this.  She states her UTI's \"never show up on a urine sample\" but asked if she could just leave a urine and have a provider call her. Unfortunately this is not the process and pt does not have mychart.    She has not been seen by a provider here since 6/8/2020 for preop exam. 3/28/2019 for dysuria.    Recommended [facility] down the [place] and declined. She asked for apts for next week here in our clinic. Transferred to scheduling. Apt scheduled 11/14    Pt verbalized understanding, in agreement with plan, and voiced no further questions.  Verna Childs RN on 11/9/2022 at 8:26 AM    "

## 2022-11-14 ENCOUNTER — OFFICE VISIT (OUTPATIENT)
Dept: OBGYN | Facility: CLINIC | Age: 72
End: 2022-11-14
Payer: MEDICARE

## 2022-11-14 VITALS
DIASTOLIC BLOOD PRESSURE: 74 MMHG | WEIGHT: 162.6 LBS | SYSTOLIC BLOOD PRESSURE: 132 MMHG | HEIGHT: 65 IN | BODY MASS INDEX: 27.09 KG/M2

## 2022-11-14 DIAGNOSIS — R30.0 DYSURIA: Primary | ICD-10-CM

## 2022-11-14 DIAGNOSIS — R31.29 OTHER MICROSCOPIC HEMATURIA: ICD-10-CM

## 2022-11-14 DIAGNOSIS — R39.15 URINARY URGENCY: ICD-10-CM

## 2022-11-14 LAB
ALBUMIN UR-MCNC: NEGATIVE MG/DL
APPEARANCE UR: CLEAR
BACTERIA #/AREA URNS HPF: ABNORMAL /HPF
BILIRUB UR QL STRIP: NEGATIVE
COLOR UR AUTO: YELLOW
GLUCOSE UR STRIP-MCNC: NEGATIVE MG/DL
HGB UR QL STRIP: ABNORMAL
KETONES UR STRIP-MCNC: NEGATIVE MG/DL
LEUKOCYTE ESTERASE UR QL STRIP: NEGATIVE
NITRATE UR QL: NEGATIVE
PH UR STRIP: 5 [PH] (ref 5–7)
RBC #/AREA URNS AUTO: ABNORMAL /HPF
SP GR UR STRIP: >=1.03 (ref 1–1.03)
UROBILINOGEN UR STRIP-ACNC: 0.2 E.U./DL
WBC #/AREA URNS AUTO: ABNORMAL /HPF

## 2022-11-14 PROCEDURE — 81001 URINALYSIS AUTO W/SCOPE: CPT | Performed by: OBSTETRICS & GYNECOLOGY

## 2022-11-14 PROCEDURE — 99213 OFFICE O/P EST LOW 20 MIN: CPT | Performed by: OBSTETRICS & GYNECOLOGY

## 2022-11-14 PROCEDURE — 87086 URINE CULTURE/COLONY COUNT: CPT | Performed by: OBSTETRICS & GYNECOLOGY

## 2022-11-14 RX ORDER — CEFDINIR 300 MG/1
CAPSULE ORAL
COMMUNITY
Start: 2022-11-12

## 2022-11-14 RX ORDER — CIPROFLOXACIN 500 MG/1
TABLET, FILM COATED ORAL
COMMUNITY
Start: 2022-11-09

## 2022-11-14 NOTE — PROGRESS NOTES
SUBJECTIVE:                                                   Cydney Menon is a 72 year old female who presents to clinic today for the following health issue(s):  Patient presents with:  Urinary Problem: Just pressure  has been going on for a couple weeks, went in wed put on cipro,           HPI:  NP    Needs a PCP.     For last couple weeks having urinary urgency, frequency. Went into [facility] 5d ago. Was put on cipro, didn't help, then went on cefninir and not helping either.     Urinates every hour.  Drinks a lot of coffee  Gets up in night every 2-3 hr.     No LMP recorded. Patient is postmenopausal..     Patient is not sexually active, .  Using menopause for contraception.    reports that she has never smoked. She has never used smokeless tobacco.  STD testing offered?  Declined - not sexually active    Health maintenance updated:  no    Today's PHQ-2 Score:   PHQ-2 (  [employer]) 2022   Q1: Little interest or pleasure in doing things 0   Q2: Feeling down, depressed or hopeless 0   PHQ-2 Score 0     Today's PHQ-9 Score:   PHQ-9 SCORE 2022   PHQ-9 Total Score 1     Today's ANASTASIA-7 Score:   ANASTASIA-7 SCORE 2022   Total Score 0       Problem list and histories reviewed & adjusted, as indicated.  Additional history: as documented.    Patient Active Problem List   Diagnosis     Actinic keratoses     Basal cell carcinoma (BCC) of back     Herpes simplex     Hypertrophic scar of upper arm     Kidney stone     Personal history of other malignant neoplasm of skin     Pyogenic granuloma     Torn ACL     Asymptomatic menopausal state     Pap smear of cervix not needed     Past Surgical History:   Procedure Laterality Date     KNEE SURGERY      ACL     ZZC REMOVAL OF KIDNEY STONE        Social History     Tobacco Use     Smoking status: Never     Smokeless tobacco: Never   Substance Use Topics     Alcohol use: Yes     Alcohol/week: 0.0 standard drinks     Comment: rarely      Problem (# of  "Occurrences) Relation (Name,Age of Onset)    Hypertension (1) Father    Lung Cancer (1) Father            Current Outpatient Medications   Medication Sig     cefdinir (OMNICEF) 300 MG capsule      Loratadine (CLARITIN PO)      multivitamin w/minerals (THERA-VIT-M) tablet Take 1 tablet by mouth daily     BACLOFEN PO  (Patient not taking: Reported on 11/14/2022)     ciprofloxacin (CIPRO) 500 MG tablet TAKE 1 TABLET (ORAL) 2 TIMES PER DAY FOR 10 DAYS FOR INFECTION (Patient not taking: Reported on 11/14/2022)     ibuprofen (ADVIL/MOTRIN) 200 MG capsule Take 200 mg by mouth     omeprazole (PRILOSEC) 20 MG DR capsule TAKE 1 CAPSULE BY MOUTH EVERY DAY BEFORE A MEAL     No current facility-administered medications for this visit.     Allergies   Allergen Reactions     Diagnostic X-Ray Materials Hives     Iodine Hives and Unknown     Morphine Nausea and Vomiting     Oxycodone Nausea and Vomiting     Shellfish Allergy Hives     Shellfish-Derived Products        ROS:  12 point review of systems negative other than symptoms noted below or in the HPI.  Genitourinary: urinary pressure        OBJECTIVE:     /74   Ht 1.638 m (5' 4.5\")   Wt 73.8 kg (162 lb 9.6 oz)   BMI 27.48 kg/m    Body mass index is 27.48 kg/m .    Exam:  Constitutional:  Appearance: Well nourished, well developed alert, in no acute distress  Neurologic:  Mental Status:  Oriented X3.  Normal strength and tone, sensory exam grossly normal, mentation intact and speech normal.    Psychiatric:  Mentation appears normal and affect normal/bright.         ASSESSMENT/PLAN:                                                        ICD-10-CM    1. Dysuria  R30.0 UA macro with reflex to Microscopic and Culture - Clinc Collect     Urine Microscopic     Urine Culture     Urine Culture      2. Other microscopic hematuria  R31.29 Adult Urology  Referral      3. Urinary urgency  R39.15 Adult Urology  Referral          -Reviewed UA with patient. Has had " small hematuria, w/o urinary tract infection at least back to 2018, is symptomatic with urinary urgency and pain. Discussed dieteary changes, inc hydration, bladder training. REf to Uro for consultation.    (21 minutes was spent on the date of the encounter doing chart review, review and interpretation of pertinent test results, history and/or exam, documentation, patient counseling.)      Arlen Gilmore Masters, [facility] FOR [facility]

## 2022-11-16 LAB — BACTERIA UR CULT: NO GROWTH

## 2022-12-04 ENCOUNTER — HEALTH MAINTENANCE LETTER (OUTPATIENT)
Age: 72
End: 2022-12-04

## 2022-12-07 ENCOUNTER — OFFICE VISIT (OUTPATIENT)
Dept: OBGYN | Facility: CLINIC | Age: 72
End: 2022-12-07
Attending: OBSTETRICS & GYNECOLOGY
Payer: MEDICARE

## 2022-12-07 ENCOUNTER — ANCILLARY PROCEDURE (OUTPATIENT)
Dept: MAMMOGRAPHY | Facility: CLINIC | Age: 72
End: 2022-12-07
Attending: OBSTETRICS & GYNECOLOGY
Payer: MEDICARE

## 2022-12-07 VITALS
BODY MASS INDEX: 26.59 KG/M2 | DIASTOLIC BLOOD PRESSURE: 60 MMHG | SYSTOLIC BLOOD PRESSURE: 119 MMHG | HEIGHT: 65 IN | WEIGHT: 159.6 LBS

## 2022-12-07 DIAGNOSIS — Z13.0 SCREENING FOR DISORDER OF BLOOD AND BLOOD-FORMING ORGANS: ICD-10-CM

## 2022-12-07 DIAGNOSIS — Z78.0 ASYMPTOMATIC MENOPAUSAL STATE: ICD-10-CM

## 2022-12-07 DIAGNOSIS — Z13.220 ENCOUNTER FOR LIPID SCREENING FOR CARDIOVASCULAR DISEASE: ICD-10-CM

## 2022-12-07 DIAGNOSIS — Z12.31 VISIT FOR SCREENING MAMMOGRAM: ICD-10-CM

## 2022-12-07 DIAGNOSIS — Z53.8 PAP SMEAR OF CERVIX NOT NEEDED: ICD-10-CM

## 2022-12-07 DIAGNOSIS — Z01.419 ENCOUNTER FOR GYNECOLOGICAL EXAMINATION WITHOUT ABNORMAL FINDING: Primary | ICD-10-CM

## 2022-12-07 DIAGNOSIS — Z13.228 SCREENING FOR METABOLIC DISORDER: ICD-10-CM

## 2022-12-07 DIAGNOSIS — M85.9 DISORDER OF BONE DENSITY AND STRUCTURE, UNSPECIFIED: ICD-10-CM

## 2022-12-07 DIAGNOSIS — Z13.6 ENCOUNTER FOR LIPID SCREENING FOR CARDIOVASCULAR DISEASE: ICD-10-CM

## 2022-12-07 DIAGNOSIS — Z13.21 ENCOUNTER FOR VITAMIN DEFICIENCY SCREENING: ICD-10-CM

## 2022-12-07 PROBLEM — L98.0 PYOGENIC GRANULOMA: Status: ACTIVE | Noted: 2022-10-03

## 2022-12-07 PROBLEM — L57.0 ACTINIC KERATOSES: Status: ACTIVE | Noted: 2022-05-03

## 2022-12-07 PROBLEM — L91.0: Status: ACTIVE | Noted: 2022-07-11

## 2022-12-07 PROBLEM — Z85.828 PERSONAL HISTORY OF OTHER MALIGNANT NEOPLASM OF SKIN: Status: ACTIVE | Noted: 2022-05-23

## 2022-12-07 PROBLEM — B00.9 HERPES SIMPLEX: Status: ACTIVE | Noted: 2022-05-03

## 2022-12-07 PROBLEM — C44.519 BASAL CELL CARCINOMA (BCC) OF BACK: Status: ACTIVE | Noted: 2022-05-03

## 2022-12-07 LAB
ALBUMIN SERPL BCG-MCNC: 4.6 G/DL (ref 3.5–5.2)
ALP SERPL-CCNC: 118 U/L (ref 35–104)
ALT SERPL W P-5'-P-CCNC: 47 U/L (ref 10–35)
ANION GAP SERPL CALCULATED.3IONS-SCNC: 13 MMOL/L (ref 7–15)
AST SERPL W P-5'-P-CCNC: 32 U/L (ref 10–35)
BILIRUB SERPL-MCNC: 0.4 MG/DL
BUN SERPL-MCNC: 16.3 MG/DL (ref 8–23)
CALCIUM SERPL-MCNC: 9.9 MG/DL (ref 8.8–10.2)
CHLORIDE SERPL-SCNC: 102 MMOL/L (ref 98–107)
CHOLEST SERPL-MCNC: 242 MG/DL
CREAT SERPL-MCNC: 0.8 MG/DL (ref 0.51–0.95)
DEPRECATED CALCIDIOL+CALCIFEROL SERPL-MC: 43 UG/L (ref 20–75)
DEPRECATED HCO3 PLAS-SCNC: 24 MMOL/L (ref 22–29)
ERYTHROCYTE [DISTWIDTH] IN BLOOD BY AUTOMATED COUNT: 11.7 % (ref 10–15)
GFR SERPL CREATININE-BSD FRML MDRD: 78 ML/MIN/1.73M2
GLUCOSE SERPL-MCNC: 93 MG/DL (ref 70–99)
HCT VFR BLD AUTO: 44.6 % (ref 35–47)
HDLC SERPL-MCNC: 50 MG/DL
HGB BLD-MCNC: 14.8 G/DL (ref 11.7–15.7)
LDLC SERPL CALC-MCNC: 170 MG/DL
MCH RBC QN AUTO: 32 PG (ref 26.5–33)
MCHC RBC AUTO-ENTMCNC: 33.2 G/DL (ref 31.5–36.5)
MCV RBC AUTO: 97 FL (ref 78–100)
NONHDLC SERPL-MCNC: 192 MG/DL
PLATELET # BLD AUTO: 258 10E3/UL (ref 150–450)
POTASSIUM SERPL-SCNC: 5 MMOL/L (ref 3.4–5.3)
PROT SERPL-MCNC: 7.8 G/DL (ref 6.4–8.3)
RBC # BLD AUTO: 4.62 10E6/UL (ref 3.8–5.2)
SODIUM SERPL-SCNC: 139 MMOL/L (ref 136–145)
TRIGL SERPL-MCNC: 112 MG/DL
WBC # BLD AUTO: 7.1 10E3/UL (ref 4–11)

## 2022-12-07 PROCEDURE — 80053 COMPREHEN METABOLIC PANEL: CPT | Performed by: OBSTETRICS & GYNECOLOGY

## 2022-12-07 PROCEDURE — 82306 VITAMIN D 25 HYDROXY: CPT | Performed by: OBSTETRICS & GYNECOLOGY

## 2022-12-07 PROCEDURE — G0101 CA SCREEN;PELVIC/BREAST EXAM: HCPCS | Performed by: OBSTETRICS & GYNECOLOGY

## 2022-12-07 PROCEDURE — 77063 BREAST TOMOSYNTHESIS BI: CPT | Mod: TC | Performed by: RADIOLOGY

## 2022-12-07 PROCEDURE — 99397 PER PM REEVAL EST PAT 65+ YR: CPT | Performed by: OBSTETRICS & GYNECOLOGY

## 2022-12-07 PROCEDURE — 36415 COLL VENOUS BLD VENIPUNCTURE: CPT | Performed by: OBSTETRICS & GYNECOLOGY

## 2022-12-07 PROCEDURE — 80061 LIPID PANEL: CPT | Performed by: OBSTETRICS & GYNECOLOGY

## 2022-12-07 PROCEDURE — 85027 COMPLETE CBC AUTOMATED: CPT | Performed by: OBSTETRICS & GYNECOLOGY

## 2022-12-07 PROCEDURE — 77067 SCR MAMMO BI INCL CAD: CPT | Mod: TC | Performed by: RADIOLOGY

## 2022-12-07 RX ORDER — OMEGA-3 FATTY ACIDS/FISH OIL 300-1000MG
200 CAPSULE ORAL
COMMUNITY

## 2022-12-07 ASSESSMENT — ANXIETY QUESTIONNAIRES
5. BEING SO RESTLESS THAT IT IS HARD TO SIT STILL: NOT AT ALL
GAD7 TOTAL SCORE: 0
2. NOT BEING ABLE TO STOP OR CONTROL WORRYING: NOT AT ALL
3. WORRYING TOO MUCH ABOUT DIFFERENT THINGS: NOT AT ALL
6. BECOMING EASILY ANNOYED OR IRRITABLE: NOT AT ALL
1. FEELING NERVOUS, ANXIOUS, OR ON EDGE: NOT AT ALL
7. FEELING AFRAID AS IF SOMETHING AWFUL MIGHT HAPPEN: NOT AT ALL
GAD7 TOTAL SCORE: 0

## 2022-12-07 ASSESSMENT — PATIENT HEALTH QUESTIONNAIRE - PHQ9
SUM OF ALL RESPONSES TO PHQ QUESTIONS 1-9: 1
5. POOR APPETITE OR OVEREATING: NOT AT ALL

## 2022-12-07 NOTE — PATIENT INSTRUCTIONS
-Daily total calcium intake (between food/supplements) should be 1200mg which equates to 5 servings calcium containing food per day; VItamin D 1000IU.   Foods rich in calcium are: milk, cheese, yogurt, seafood, sardines and canned salmon, leafy green vegetables such as ita greens, spinach and kale, beans and lentils, almonds, seeds (poppy, sesame, celery, adry), rhubarb, dried fruit such as figs, whey protein, tofu and edamame, amaranth, other foods with added calcium such as orange juice and some cereals.   If adequate amount not taken in diet, then a supplement may be needed.     -I also recommend increasing your dietary fiber by starting Metamucil (powder mixed in glass of water) once to twice daily    Patient Education   Personalized Prevention Plan  You are due for the preventive services outlined below.  Your care team is available to assist you in scheduling these services.  If you have already completed any of these items, please share that information with your care team to update in your medical record.  Health Maintenance Due   Topic Date Due     Osteoporosis Screening  Never done     Hepatitis C Screening  Never done     Pneumococcal Vaccine (1 - PCV) Never done     Mammogram  12/27/2020     Cholesterol Lab  12/12/2021     COVID-19 Vaccine (4 - Booster for Moderna series) 01/13/2022

## 2022-12-07 NOTE — PROGRESS NOTES
Cydney is a 72 year old  female who presents for annual exam.     Besides routine health maintenance,  she would like to discuss respiratory infection and congestion  .    Do you have a Health Care Directive?: Yes, advance care planning is on file.    Fall risk:   Fallen 2 or more times in the past year?: No  Any fall with injury in the past year?: No    HPI:  The patient's PCP is  [facility].     Sees Urology in .     Looking for PCP    Had Colonoscopy a year ago in [place] at [facility]. Told to return in 5yr. No records in Epic.     Has tried melatonin for sleep.      GYNECOLOGIC HISTORY:  No LMP recorded. Patient is postmenopausal..   reports that she has never smoked. She has never used smokeless tobacco.    Patient is not sexually active.  STD testing offered?  Declined     Last PHQ-9 score on record=   PHQ-9 SCORE 2022   PHQ-9 Total Score 1     Last GAD7 score on record=   ANASTASIA-7 SCORE 2016   Total Score 0 0     Alcohol Score = 1    HEALTH MAINTENANCE:  Cholesterol:   Recent Labs   Lab Test 16  0859   CHOL 226*   HDL 44*   *   TRIG 196*       Last Mammo: 2016, Result: Normal, Next Mammo: Today     Pap:   Lab Results   Component Value Date    PAP NIL 2016      DEXA:    Colonoscopy:  2016, Result:  Normal, Next Colonoscopy: 5 years.    Health maintenance updated:  yes    HISTORY:  OB History    Para Term  AB Living   3 3 0 0 0 3   SAB IAB Ectopic Multiple Live Births   0 0 0 0 3      # Outcome Date GA Lbr Warren/2nd Weight Sex Delivery Anes PTL Lv   3 Para            2 Para            1 Para              Patient Active Problem List   Diagnosis     Actinic keratoses     Basal cell carcinoma (BCC) of back     Herpes simplex     Hypertrophic scar of upper arm     Kidney stone     Personal history of other malignant neoplasm of skin     Pyogenic granuloma     Torn ACL     Asymptomatic menopausal state     Pap smear of  "cervix not needed     Past Surgical History:   Procedure Laterality Date     KNEE SURGERY  2014    ACL     ZZC REMOVAL OF KIDNEY STONE  2000      Social History     Tobacco Use     Smoking status: Never     Smokeless tobacco: Never   Substance Use Topics     Alcohol use: Yes     Alcohol/week: 0.0 standard drinks     Comment: rarely      Problem (# of Occurrences) Relation (Name,Age of Onset)    Hypertension (1) Father    Lung Cancer (1) Father            Current Outpatient Medications   Medication Sig     cefdinir (OMNICEF) 300 MG capsule      Loratadine (CLARITIN PO)      multivitamin w/minerals (THERA-VIT-M) tablet Take 1 tablet by mouth daily     omeprazole (PRILOSEC) 20 MG DR capsule TAKE 1 CAPSULE BY MOUTH EVERY DAY BEFORE A MEAL     BACLOFEN PO  (Patient not taking: Reported on 11/14/2022)     ciprofloxacin (CIPRO) 500 MG tablet TAKE 1 TABLET (ORAL) 2 TIMES PER DAY FOR 10 DAYS FOR INFECTION (Patient not taking: Reported on 11/14/2022)     ibuprofen (ADVIL/MOTRIN) 200 MG capsule Take 200 mg by mouth     No current facility-administered medications for this visit.       Allergies   Allergen Reactions     Diagnostic X-Ray Materials Hives     Iodine Hives and Unknown     Morphine Nausea and Vomiting     Oxycodone Nausea and Vomiting     Shellfish Allergy Hives     Shellfish-Derived Products        Past medical, surgical, social and family history were reviewed and updated in EPIC.    ROS:   12 point review of systems negative other than symptoms noted below or in the HPI.      EXAM:  /60   Ht 1.638 m (5' 4.5\")   Wt 72.4 kg (159 lb 9.6 oz)   BMI 26.97 kg/m     BMI: Body mass index is 26.97 kg/m .    EXAM:  Constitutional: Appearance: Well nourished, well developed alert, in no acute distress  Breasts: Inspection of Breasts:  No lymphadenopathy present., Palpation of Breasts and Axillae:  No masses present on palpation, no breast tenderness., Axillary Lymph Nodes:  No lymphadenopathy present. and No " nodularity, asymmetry or nipple discharge bilaterally.  Gastrointestinal:  Abdominal Examination:  Abdomen nontender to palpation, tone normal without     rigidity or guarding, no masses present, umbilicus without lesions    Liver and speen:  No hepatomegaly present, liver nontender to palpation    Hernias:  No hernias present  Lymphatic: Lymph Nodes:  No other lymphadenopathy present  Skin:  General Inspection:  No rashes present, no lesions present, no areas of  discoloration.    Genitalia and Groin:  No rashes present, no lesions present, no areas of  discoloration, no masses present  Neurologic/Psychiatric:    Mental Status:  Oriented X3     Pelvic Exam:  External Genitalia:     Normal appearance for age, no discharge present, no tenderness present, no inflammatory lesions present, color normal  Vagina:     Normal vaginal vault without central or paravaginal defects, no discharge present, no inflammatory lesions present, no masses present  Bladder:     Nontender to palpation  Urethra:   Urethral Body:  Urethra palpation normal, urethra structural support normal   Urethral Meatus:  No erythema or lesions present  Cervix:     Appearance healthy, no lesions present, nontender to palpation, no bleeding present  Uterus:     Uterus: firm, normal sized and nontender, midplane in position.   Adnexa:     No adnexal tenderness present, no adnexal masses present  Perineum:     Perineum within normal limits, no evidence of trauma, no rashes or skin lesions present  Anus:     Anus within normal limits, no hemorrhoids present  Inguinal Lymph Nodes:     No lymphadenopathy present  Pubic Hair:     Normal pubic hair distribution for age  Genitalia and Groin:     No rashes present, no lesions present, no areas of discoloration, no masses present      COUNSELING:   Reviewed preventive health counseling, as reflected in patient instructions       Osteoporosis prevention/bone health    BMI:  Body mass index is 26.97 kg/m .      reports that she has never smoked. She has never used smokeless tobacco.      ASSESSMENT:  72 year old female with satisfactory annual exam.    ICD-10-CM    1. Encounter for gynecological examination without abnormal finding  Z01.419       2. Pap smear of cervix not needed  Z53.8       3. Encounter for vitamin deficiency screening  Z13.21       4. Encounter for lipid screening for cardiovascular disease  Z13.220 Comprehensive metabolic panel    Z13.6 Lipid panel reflex to direct LDL Fasting     Comprehensive metabolic panel     Lipid panel reflex to direct LDL Fasting      5. Screening for metabolic disorder  Z13.228 Comprehensive metabolic panel     Lipid panel reflex to direct LDL Fasting     Comprehensive metabolic panel     Lipid panel reflex to direct LDL Fasting      6. Screening for disorder of blood and blood-forming organs  Z13.0 CBC with Platelets     CBC with Platelets      7. Asymptomatic menopausal state  Z78.0 DX Hip/Pelvis/Spine     Vitamin D Deficiency     Vitamin D Deficiency      8. Disorder of bone density and structure, unspecified  M85.9 Vitamin D Deficiency     Vitamin D Deficiency          PLAN:  -No further need  for cervical cancer screening.   -Breast self awareness discussed. UTD for mammogram.  -Colonoscopy UTD  -Osteoporosis prevention discussed. Due for dexa  -Desires fasting and metabolic labs. Discussed if develops abnormalities, or conditions such as DM, HTN, HPL would send to a primary care doctor  -PMB precautions  -Return one year for next annual exam          Arlen Gilmore Masters, DO

## 2023-04-06 ENCOUNTER — ANCILLARY PROCEDURE (OUTPATIENT)
Dept: ULTRASOUND IMAGING | Facility: CLINIC | Age: 73
End: 2023-04-06
Attending: UROLOGY
Payer: MEDICARE

## 2023-04-06 ENCOUNTER — ANCILLARY PROCEDURE (OUTPATIENT)
Dept: CT IMAGING | Facility: CLINIC | Age: 73
End: 2023-04-06
Attending: UROLOGY
Payer: MEDICARE

## 2023-04-06 DIAGNOSIS — R31.29 MICROSCOPIC HEMATURIA: ICD-10-CM

## 2023-04-06 PROCEDURE — 74176 CT ABD & PELVIS W/O CONTRAST: CPT

## 2023-04-06 PROCEDURE — 76770 US EXAM ABDO BACK WALL COMP: CPT

## 2023-11-30 NOTE — PROGRESS NOTES
SUBJECTIVE:                                                   Cydney Menon is a 68 year old female who presents to clinic today for the following health issue(s):  Patient presents with:  Vaginal Problem: patient was treated for UTI about 3 weeks ago, was seen at [facility] recently because she thought UTI was coming back. started having vaginal itching 3-4 days ago. no longer has UTI symptoms-did not finish medications because she was nervous about getting a vaginal infection      HPI: patient has had a UTI for last 3 weeks, macrobid did not help had to take bactrim/septra finally which helped, but now she feels like she has yeast infection. Did use a OC cream last night was not monistat per patient tho, cannot remember name.      No LMP recorded. Patient is postmenopausal..   Patient is sexually active, .  Using menopause for contraception.    reports that she has never smoked. She has never used smokeless tobacco.    STD testing offered?  Declined    Health maintenance updated:  yes    Today's PHQ-2 Score: No flowsheet data found.  Today's PHQ-9 Score:   PHQ-9 SCORE 2016   Total Score 0     Today's ANASTASIA-7 Score:   ANASTASIA-7 SCORE 2016   Total Score 0       Problem list and histories reviewed & adjusted, as indicated.  Additional history: as documented.    There is no problem list on file for this patient.    Past Surgical History:   Procedure Laterality Date     C REMOVAL OF KIDNEY STONE       KNEE SURGERY      ACL      Social History   Substance Use Topics     Smoking status: Never Smoker     Smokeless tobacco: Never Used     Alcohol use Not on file      Problem (# of Occurrences) Relation (Name,Age of Onset)    Hypertension (1) Father            Current Outpatient Prescriptions   Medication Sig     fluconazole (DIFLUCAN) 150 MG tablet Take 1 tablet (150 mg) by mouth every 3 days     Loratadine (CLARITIN PO)      multivitamin, therapeutic with minerals (MULTI-VITAMIN) TABS tablet  Thank you for letting us take care of you today. We hope all your questions were addressed. If a question was overlooked or something else comes to mind after you return home, please contact a member of your Care Team listed below. Your Care Team at [address] [facility] is Team #1  Jodie Segura, Faculty Advisor  Yakelin Whipple, Resident Physician  Huan Harding, Resident Physician  Agata Salamanca, Resident Physician  Suyapa Mckeon, [facility], [address] [facility]  [facility], [address] [facility], [facility]  [facility]  Lavern Nuñez, [address] [facility] [place],   Aleida Brown, [facility] (Clinical Pharmacist)     Office phone number: [phone]    If you need to get in right away due to illness, please be advised we have \"Same Day\" appointments available Monday-Friday. Please call us at [phone] option #3 to schedule your \"Same Day\" appointment. "Take 1 tablet by mouth daily     No current facility-administered medications for this visit.      Allergies   Allergen Reactions     Diagnostic X-Ray Materials Hives     Morphine Nausea and Vomiting     Oxycodone Nausea and Vomiting     Shellfish-Derived Products        ROS:  12 point review of systems negative other than symptoms noted below.  Genitourinary: Vaginal Itching    OBJECTIVE:     /72  Ht 5' 4\" (1.626 m)  Wt 167 lb 6.4 oz (75.9 kg)  BMI 28.73 kg/m2  Body mass index is 28.73 kg/(m^2).    Exam:  Constitutional:  Appearance: Well nourished, well developed alert, in no acute distress  External genitalia, slightly red and irritated.  Vagina moderate amount of yellowish discharge.  Vaginal culture obtained and sent.  Cervix normal.  Will send a     In-Clinic Test Results:  Results for orders placed or performed in visit on 06/04/18 (from the past 24 hour(s))   UA with Microscopic   Result Value Ref Range    Color Urine Yellow     Appearance Urine Clear     Glucose Urine Negative NEG^Negative mg/dL    Bilirubin Urine Negative NEG^Negative    Ketones Urine Trace (A) NEG^Negative mg/dL    Specific Gravity Urine 1.025 1.003 - 1.035    pH Urine 5.5 5.0 - 7.0 pH    Protein Albumin Urine Negative NEG^Negative mg/dL    Urobilinogen Urine 0.2 0.2 - 1.0 EU/dL    Nitrite Urine Negative NEG^Negative    Blood Urine Trace (A) NEG^Negative    Leukocyte Esterase Urine Negative NEG^Negative    Source Midstream Urine     WBC Urine 0 - 5 OTO5^0 - 5 /HPF    RBC Urine O - 2 OTO2^O - 2 /HPF    Bacteria Urine Moderate (A) NEG^Negative /HPF    Mucous Urine Present (A) NEG^Negative /LPF       ASSESSMENT/PLAN:                                                        ICD-10-CM    1. Possible urinary tract infection N39.0 UA with Microscopic   2. Itching of vagina L29.8 Gram stain     Group B strep PCR     Yeast culture     fluconazole (DIFLUCAN) 150 MG tablet   3. Dysuria R30.0 Urine Culture Aerobic Bacterial       There are " no Patient Instructions on file for this visit.     sent.    Patient to start diflucan.  Will use cortisone cream externally.  Will notify patient with lab results when available.  REturn prn.    RIZWANA Vaca [facility]

## 2024-01-13 ENCOUNTER — HEALTH MAINTENANCE LETTER (OUTPATIENT)
Age: 74
End: 2024-01-13

## 2024-09-11 ENCOUNTER — ANCILLARY PROCEDURE (OUTPATIENT)
Dept: MAMMOGRAPHY | Facility: CLINIC | Age: 74
End: 2024-09-11
Attending: OBSTETRICS & GYNECOLOGY
Payer: MEDICARE

## 2024-09-11 DIAGNOSIS — Z12.31 VISIT FOR SCREENING MAMMOGRAM: ICD-10-CM

## 2024-09-11 PROCEDURE — 77063 BREAST TOMOSYNTHESIS BI: CPT | Mod: TC | Performed by: RADIOLOGY

## 2024-09-11 PROCEDURE — 77067 SCR MAMMO BI INCL CAD: CPT | Mod: TC | Performed by: RADIOLOGY

## 2025-01-26 ENCOUNTER — HEALTH MAINTENANCE LETTER (OUTPATIENT)
Age: 75
End: 2025-01-26